# Patient Record
Sex: MALE | URBAN - METROPOLITAN AREA
[De-identification: names, ages, dates, MRNs, and addresses within clinical notes are randomized per-mention and may not be internally consistent; named-entity substitution may affect disease eponyms.]

---

## 2021-07-15 ENCOUNTER — OFFICE VISIT (OUTPATIENT)
Dept: URBAN - METROPOLITAN AREA CLINIC 64 | Facility: CLINIC | Age: 66
End: 2021-07-15
Payer: MEDICARE

## 2021-07-15 PROCEDURE — 92004 COMPRE OPH EXAM NEW PT 1/>: CPT | Performed by: OPTOMETRIST

## 2021-07-15 ASSESSMENT — KERATOMETRY
OD: 43.10
OS: 43.02

## 2021-07-15 ASSESSMENT — INTRAOCULAR PRESSURE
OD: 12
OS: 10

## 2021-07-15 NOTE — IMPRESSION/PLAN
Impression: Combined forms of age-related cataract, bilateral: H25.813. Plan: Discussed cataract diagnosis with the patient. Discussed and reviewed treatment options for cataracts. Risks and benefits of surgical procedure were explained and understood. Patient elects surgical treatment. Schedule for consult with TRINY or LONG.

## 2021-07-28 ENCOUNTER — OFFICE VISIT (OUTPATIENT)
Dept: URBAN - METROPOLITAN AREA CLINIC 64 | Facility: CLINIC | Age: 66
End: 2021-07-28
Payer: MEDICARE

## 2021-07-28 DIAGNOSIS — H52.222 REGULAR ASTIGMATISM, LEFT EYE: ICD-10-CM

## 2021-07-28 DIAGNOSIS — H52.221 REGULAR ASTIGMATISM, RIGHT EYE: ICD-10-CM

## 2021-07-28 DIAGNOSIS — H25.811 COMBINED FORMS OF AGE-RELATED CATARACT, RIGHT EYE: ICD-10-CM

## 2021-07-28 DIAGNOSIS — D36.9 BENIGN NEOPLASM: ICD-10-CM

## 2021-07-28 PROCEDURE — 92136 OPHTHALMIC BIOMETRY: CPT | Performed by: OPHTHALMOLOGY

## 2021-07-28 PROCEDURE — 99204 OFFICE O/P NEW MOD 45 MIN: CPT | Performed by: OPHTHALMOLOGY

## 2021-07-28 ASSESSMENT — INTRAOCULAR PRESSURE
OS: 12
OD: 10

## 2021-07-28 ASSESSMENT — VISUAL ACUITY
OD: 20/20
OS: 20/40

## 2021-07-28 NOTE — IMPRESSION/PLAN
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 Plan: Papilloma TAY. Discussed. Patient will contact us if he elects office procedure. ,

## 2021-07-28 NOTE — IMPRESSION/PLAN
Impression: Regular astigmatism, right eye: H52.221. Plan: Recommend AM in OD at time of cataract surgery for best vision.

## 2021-07-28 NOTE — IMPRESSION/PLAN
Impression: Combined forms of age-related cataract, right eye: H25.811. Plan: OD does not qualify for surgery at this time. Anticipate anisometropia.  See bilateral plan

## 2021-07-28 NOTE — IMPRESSION/PLAN
Impression: Combined forms of age-related cataract, bilateral: H25.813. Plan: Discussed cataracts, treatment options, and surgical risks/benefits with patient. Patient elects surgical treatment. Recommend surgery OS, Aim OS: -0.25. OD does not qualify for surgery at this time. Anticipate anisometropia. When OD qualifies, Aim OD: plano. Candidate for all advanced technologies. Recommend ORA. Recommend Vivity IOL to reduce reliance on readers. Discussed surgical benefits/risks with patient of EDOF lens. For Vivity IOL  Aim OS: -0.25. When OD qualifies, Aim OD: plano for Vivity IOL.

## 2021-08-20 ENCOUNTER — ADULT PHYSICAL (OUTPATIENT)
Dept: URBAN - METROPOLITAN AREA CLINIC 64 | Facility: CLINIC | Age: 66
End: 2021-08-20
Payer: MEDICARE

## 2021-08-20 DIAGNOSIS — H25.813 COMBINED FORMS OF AGE-RELATED CATARACT, BILATERAL: ICD-10-CM

## 2021-08-20 DIAGNOSIS — Z01.818 ENCOUNTER FOR OTHER PREPROCEDURAL EXAMINATION: Primary | ICD-10-CM

## 2021-08-20 PROCEDURE — 99203 OFFICE O/P NEW LOW 30 MIN: CPT | Performed by: NURSE PRACTITIONER

## 2021-08-31 ENCOUNTER — SURGERY (OUTPATIENT)
Dept: URBAN - METROPOLITAN AREA SURGERY 42 | Facility: SURGERY | Age: 66
End: 2021-08-31
Payer: MEDICARE

## 2021-08-31 PROCEDURE — 66984 XCAPSL CTRC RMVL W/O ECP: CPT | Performed by: OPHTHALMOLOGY

## 2021-09-01 ENCOUNTER — POST-OPERATIVE VISIT (OUTPATIENT)
Dept: URBAN - METROPOLITAN AREA CLINIC 64 | Facility: CLINIC | Age: 66
End: 2021-09-01
Payer: MEDICARE

## 2021-09-01 PROCEDURE — 99024 POSTOP FOLLOW-UP VISIT: CPT | Performed by: OPTOMETRIST

## 2021-09-01 ASSESSMENT — INTRAOCULAR PRESSURE: OS: 17

## 2021-09-01 NOTE — IMPRESSION/PLAN
Impression: S/P Cataract Extraction by phacoemulsification with IOL placement/LenSx/ORA OS - 1 Day. Encounter for surgical aftercare following surgery on a sense organ  Z48.810. Plan: Dicussed the flashes patient reported. Normal exam patient to return in 1 week. If flashes get worse to call ASAP.

## 2021-09-07 ENCOUNTER — POST-OPERATIVE VISIT (OUTPATIENT)
Dept: URBAN - METROPOLITAN AREA CLINIC 64 | Facility: CLINIC | Age: 66
End: 2021-09-07
Payer: MEDICARE

## 2021-09-07 DIAGNOSIS — Z48.810 ENCOUNTER FOR SURGICAL AFTERCARE FOLLOWING SURGERY ON A SENSE ORGAN: Primary | ICD-10-CM

## 2021-09-07 PROCEDURE — 99024 POSTOP FOLLOW-UP VISIT: CPT | Performed by: OPTOMETRIST

## 2021-09-07 ASSESSMENT — INTRAOCULAR PRESSURE
OS: 10
OD: 11

## 2021-09-07 ASSESSMENT — VISUAL ACUITY: OS: 20/20

## 2021-09-07 NOTE — IMPRESSION/PLAN
Impression: S/P Cataract Extraction by phacoemulsification with IOL placement/LenSx/ORA OS - 7 Days. Encounter for surgical aftercare following surgery on a sense organ  Z48.810.  Excellent post op course Plan: